# Patient Record
Sex: FEMALE | Race: WHITE | URBAN - NONMETROPOLITAN AREA
[De-identification: names, ages, dates, MRNs, and addresses within clinical notes are randomized per-mention and may not be internally consistent; named-entity substitution may affect disease eponyms.]

---

## 2017-01-01 ENCOUNTER — COMMUNICATION - GICH (OUTPATIENT)
Dept: INTERNAL MEDICINE | Facility: OTHER | Age: 67
End: 2017-01-01

## 2017-01-01 DIAGNOSIS — J44.1 CHRONIC OBSTRUCTIVE PULMONARY DISEASE WITH ACUTE EXACERBATION (H): ICD-10-CM

## 2017-01-01 DIAGNOSIS — J44.9 CHRONIC OBSTRUCTIVE PULMONARY DISEASE (H): ICD-10-CM

## 2017-02-13 ENCOUNTER — COMMUNICATION - GICH (OUTPATIENT)
Dept: INTERNAL MEDICINE | Facility: OTHER | Age: 67
End: 2017-02-13

## 2017-02-13 DIAGNOSIS — F41.1 GENERALIZED ANXIETY DISORDER: ICD-10-CM

## 2017-03-08 ENCOUNTER — COMMUNICATION - GICH (OUTPATIENT)
Dept: INTERNAL MEDICINE | Facility: OTHER | Age: 67
End: 2017-03-08

## 2017-03-08 DIAGNOSIS — J44.9 CHRONIC OBSTRUCTIVE PULMONARY DISEASE (H): ICD-10-CM

## 2017-05-08 ENCOUNTER — COMMUNICATION - GICH (OUTPATIENT)
Dept: INTERNAL MEDICINE | Facility: OTHER | Age: 67
End: 2017-05-08

## 2017-05-08 DIAGNOSIS — F41.1 GENERALIZED ANXIETY DISORDER: ICD-10-CM

## 2017-06-07 ENCOUNTER — COMMUNICATION - GICH (OUTPATIENT)
Dept: INTERNAL MEDICINE | Facility: OTHER | Age: 67
End: 2017-06-07

## 2017-06-07 DIAGNOSIS — F41.1 GENERALIZED ANXIETY DISORDER: ICD-10-CM

## 2017-07-05 ENCOUNTER — COMMUNICATION - GICH (OUTPATIENT)
Dept: INTERNAL MEDICINE | Facility: OTHER | Age: 67
End: 2017-07-05

## 2017-07-05 DIAGNOSIS — F41.1 GENERALIZED ANXIETY DISORDER: ICD-10-CM

## 2017-08-03 ENCOUNTER — COMMUNICATION - GICH (OUTPATIENT)
Dept: INTERNAL MEDICINE | Facility: OTHER | Age: 67
End: 2017-08-03

## 2017-08-03 DIAGNOSIS — F41.1 GENERALIZED ANXIETY DISORDER: ICD-10-CM

## 2017-08-23 ENCOUNTER — COMMUNICATION - GICH (OUTPATIENT)
Dept: INTERNAL MEDICINE | Facility: OTHER | Age: 67
End: 2017-08-23

## 2017-08-23 DIAGNOSIS — J44.9 CHRONIC OBSTRUCTIVE PULMONARY DISEASE (H): ICD-10-CM

## 2017-08-30 ENCOUNTER — HISTORY (OUTPATIENT)
Dept: INTERNAL MEDICINE | Facility: OTHER | Age: 67
End: 2017-08-30

## 2017-08-30 ENCOUNTER — OFFICE VISIT - GICH (OUTPATIENT)
Dept: INTERNAL MEDICINE | Facility: OTHER | Age: 67
End: 2017-08-30

## 2017-08-30 DIAGNOSIS — F41.1 GENERALIZED ANXIETY DISORDER: ICD-10-CM

## 2017-08-30 DIAGNOSIS — I21.4 NON-ST ELEVATION (NSTEMI) MYOCARDIAL INFARCTION (H): ICD-10-CM

## 2017-08-30 DIAGNOSIS — J44.9 CHRONIC OBSTRUCTIVE PULMONARY DISEASE (H): ICD-10-CM

## 2017-08-30 LAB
A/G RATIO - HISTORICAL: 1.4 (ref 1–2)
ABSOLUTE BASOPHILS - HISTORICAL: 0.1 THOU/CU MM
ABSOLUTE EOSINOPHILS - HISTORICAL: 0.3 THOU/CU MM
ABSOLUTE IMMATURE GRANULOCYTES(METAS,MYELOS,PROS) - HISTORICAL: 0 THOU/CU MM
ABSOLUTE LYMPHOCYTES - HISTORICAL: 1.2 THOU/CU MM (ref 0.9–2.9)
ABSOLUTE MONOCYTES - HISTORICAL: 0.7 THOU/CU MM
ABSOLUTE NEUTROPHILS - HISTORICAL: 5.8 THOU/CU MM (ref 1.7–7)
ALBUMIN SERPL-MCNC: 4.1 G/DL (ref 3.5–5.7)
ALP SERPL-CCNC: 117 IU/L (ref 34–104)
ALT (SGPT) - HISTORICAL: 17 IU/L (ref 7–52)
ANION GAP - HISTORICAL: 12 (ref 5–18)
AST SERPL-CCNC: 19 IU/L (ref 13–39)
BASOPHILS # BLD AUTO: 0.8 %
BILIRUB SERPL-MCNC: 0.7 MG/DL (ref 0.3–1)
BUN SERPL-MCNC: 12 MG/DL (ref 7–25)
BUN/CREAT RATIO - HISTORICAL: 18
CALCIUM SERPL-MCNC: 10 MG/DL (ref 8.6–10.3)
CHLORIDE SERPLBLD-SCNC: 104 MMOL/L (ref 98–107)
CO2 SERPL-SCNC: 25 MMOL/L (ref 21–31)
CREAT SERPL-MCNC: 0.67 MG/DL (ref 0.7–1.3)
EOSINOPHIL NFR BLD AUTO: 3.3 %
ERYTHROCYTE [DISTWIDTH] IN BLOOD BY AUTOMATED COUNT: 12.1 % (ref 11.5–15.5)
GFR IF NOT AFRICAN AMERICAN - HISTORICAL: >60 ML/MIN/1.73M2
GLOBULIN - HISTORICAL: 3 G/DL (ref 2–3.7)
GLUCOSE SERPL-MCNC: 101 MG/DL (ref 70–105)
HCT VFR BLD AUTO: 44.3 % (ref 33–51)
HEMOGLOBIN: 15 G/DL (ref 12–16)
IMMATURE GRANULOCYTES(METAS,MYELOS,PROS) - HISTORICAL: 0.3 %
LYMPHOCYTES NFR BLD AUTO: 15.1 % (ref 20–44)
MCH RBC QN AUTO: 33 PG (ref 26–34)
MCHC RBC AUTO-ENTMCNC: 33.9 G/DL (ref 32–36)
MCV RBC AUTO: 97 FL (ref 80–100)
MONOCYTES NFR BLD AUTO: 8.3 %
NEUTROPHILS NFR BLD AUTO: 72.2 % (ref 42–72)
PLATELET # BLD AUTO: 219 THOU/CU MM (ref 140–440)
PMV BLD: 9.5 FL (ref 6.5–11)
POTASSIUM SERPL-SCNC: 4.2 MMOL/L (ref 3.5–5.1)
PROT SERPL-MCNC: 7.1 G/DL (ref 6.4–8.9)
RED BLOOD COUNT - HISTORICAL: 4.55 MIL/CU MM (ref 4–5.2)
SODIUM SERPL-SCNC: 141 MMOL/L (ref 133–143)
WHITE BLOOD COUNT - HISTORICAL: 8 THOU/CU MM (ref 4.5–11)

## 2017-08-30 ASSESSMENT — ANXIETY QUESTIONNAIRES
GAD7 TOTAL SCORE: 6
5. BEING SO RESTLESS THAT IT IS HARD TO SIT STILL: NOT AT ALL
3. WORRYING TOO MUCH ABOUT DIFFERENT THINGS: SEVERAL DAYS
1. FEELING NERVOUS, ANXIOUS, OR ON EDGE: MORE THAN HALF THE DAYS
4. TROUBLE RELAXING: NOT AT ALL
6. BECOMING EASILY ANNOYED OR IRRITABLE: MORE THAN HALF THE DAYS
2. NOT BEING ABLE TO STOP OR CONTROL WORRYING: NOT AT ALL
7. FEELING AFRAID AS IF SOMETHING AWFUL MIGHT HAPPEN: SEVERAL DAYS

## 2017-08-30 ASSESSMENT — PATIENT HEALTH QUESTIONNAIRE - PHQ9: SUM OF ALL RESPONSES TO PHQ QUESTIONS 1-9: 0

## 2017-09-23 ENCOUNTER — AMBULATORY - GICH (OUTPATIENT)
Dept: SCHEDULING | Facility: OTHER | Age: 67
End: 2017-09-23

## 2017-12-28 NOTE — TELEPHONE ENCOUNTER
Patient Information     Patient Name MRN Aggie Peacock [id number] Female 1950      Telephone Encounter by Letty rBock RN at 2017 10:01 AM     Author:  Letty Brock RN Service:  (none) Author Type:  NURS- Registered Nurse     Filed:  2017 10:03 AM Encounter Date:  11/3/2017 Status:  Signed     :  Letty Brock RN (NURS- Registered Nurse)            SYMBICORT 160-4.5 mcg/actuation (160-4.5 mcg each actuation) inhaler  INHALE TWO PUFFS BY MOUTH TWICE DAILY  Disp: Not specified (Pharmacy requested 33 NO_MATCH)     Refills: 0     Class: eRx Start: 11/3/2017    For: COPD (chronic obstructive pulmonary disease) (HC)  Originally ordered: 2 years ago by Anthony Mckinney MD  Last refill:2017  To be filled at: [facility] [address] [place], [place] [address]Phone: [phone]  Last visit with ANTHONY MCKINNEY was on: 2017 in [facility]  PCP:  Anthony Mckinney MD     Unable to complete prescription refill per RN Medication Refill Policy.................... LETTY BROCK, ERIC ....................  2017   10:01 AM

## 2017-12-28 NOTE — TELEPHONE ENCOUNTER
Patient Information     Patient Name MRN Sex Aggie Sabillon [id number] Female 1950      Telephone Encounter by Adalgisa Bentley LPN at 2017  1:43 PM     Author:  Adalgisa Bentley LPN Service:  (none) Author Type:  NURS- Licensed Practical Nurse     Filed:  2017  1:44 PM Encounter Date:  8/3/2017 Status:  Signed     :  Adalgisa Bentley LPN (NURS- Licensed Practical Nurse)            Contacted the patient and let her know Cayden Hartman MD would like her to have a follow up visit. The patient was transferred to scheduling to make a appointment.  Adalgisa Bentley LPN......2017  1:43 PM

## 2017-12-28 NOTE — TELEPHONE ENCOUNTER
Patient Information     Patient Name MRN Sex Aggie Sabillon [id number] Female 1950      Telephone Encounter by Mary Martinez RN at 2017 10:58 AM     Author:  Mary Martinez RN Service:  (none) Author Type:  NURS- Registered Nurse     Filed:  2017 11:01 AM Encounter Date:  2017 Status:  Signed     :  Mary Martinez RN (NURS- Registered Nurse)            Medication is not on refill protocol. Unable to complete prescription refill per RN Medication Refill Policy.................... MARY MARTINEZ RN ....................  2017   10:59 AM      Will route to [facility] for consideration  This is a Refill request from: [facility]  Name of Medication: Xanax 0.5mg  Quantity requested: 60  Last fill date: 5/10/17  Last visit with ANTHONY MCKINNEY was on: 2016 in [facility]  PCP:  Anthony Mckinney MD  Controlled Substance Agreement:  none   Diagnosis r/t this medication request: IRVING

## 2017-12-28 NOTE — TELEPHONE ENCOUNTER
Patient Information     Patient Name MRN Sex Aggie Sabillon [id number] Female 1950      Telephone Encounter by Shirin Carvajal RN at 2017 11:24 AM     Author:  Shirin Carvajal RN Service:  (none) Author Type:  NURS- Registered Nurse     Filed:  2017 11:27 AM Encounter Date:  8/3/2017 Status:  Signed     :  Shirin Carvajal RN (NURS- Registered Nurse)            This is a Refill request from: [facility]  Name of Medication: alprazolam  Quantity requested: 60  Last fill date: 17  Due for refill: yes  Last visit with CAYDEN MCKINNEY was on: 2016 in [facility]  PCP:  Cayden Mckinney MD  Controlled Substance Agreement:  None found   Diagnosis r/t this medication request: Generalized anxiety disorder     Unable to complete prescription refill per RN Medication Refill Policy.................... Shirin Carvajal RN ....................  2017   11:24 AM

## 2017-12-28 NOTE — TELEPHONE ENCOUNTER
Patient Information     Patient Name MRN Sex Aggie Sabillon [id number] Female 1950      Telephone Encounter by Janice Wagner RN at 2017  3:38 PM     Author:  Janice Wagner RN Service:  (none) Author Type:  NURS- Registered Nurse     Filed:  2017  3:44 PM Encounter Date:  2017 Status:  Signed     :  Janice Wagner RN (NURS- Registered Nurse)            Patient lost inhaler. Requesting order for replacement.    Oral Steroid Inhalers    Office visit in the past 12 months.    Last visit with ANTHONY MCKINNEY was on: 2016 in [facility]  Next visit with ANTHONY MCKINNEY is on: No future appointment listed with this provider  Next visit with Internal Medicine is on: No future appointment listed in this department    Max refills 12 months from last office visit.    Prescription refilled per RN Medication Refill Policy.................... Janice Wagner RN ....................  2017   3:41 PM

## 2017-12-28 NOTE — TELEPHONE ENCOUNTER
Patient Information     Patient Name MRN Aggie Peacock [id number] Female 1950      Telephone Encounter by Cayden Hartman MD at 2017 11:41 AM     Author:  Cayden Hartman MD Service:  (none) Author Type:  Physician     Filed:  2017 11:41 AM Encounter Date:  8/3/2017 Status:  Signed     :  Cayden Hartman MD (Physician)            Notify the patient that she is due for a follow-up visit.

## 2017-12-28 NOTE — TELEPHONE ENCOUNTER
Patient Information     Patient Name MRN Aggie Peacock [id number] Female 1950      Telephone Encounter by Padma Lau DO at 2017  3:39 PM     Author:  Padma Lau DO Service:  (none) Author Type:  PHYS- Osteopathic     Filed:  2017  3:39 PM Encounter Date:  2017 Status:  Signed     :  Padma Lau DO (PHYS- Osteopathic)            Prescription printed and ready to fax

## 2017-12-28 NOTE — TELEPHONE ENCOUNTER
"Patient Information     Patient Name MRN Aggie Peacock [id number] Female 1950      Telephone Encounter by Letty Duran RN at 11/3/2017  9:02 AM     Author:  Letty Duran RN Service:  (none) Author Type:  NURS- Registered Nurse     Filed:  11/3/2017  9:15 AM Encounter Date:  10/31/2017 Status:  Signed     :  Letty Duran RN (NURS- Registered Nurse)            Nebulizers    Office visit in the past 12 months.    Last visit with ANTHONY MCKINNEY was on: 2017 in [facility]  Next visit with ANTHONY MCKINNEY is on: No future appointment listed with this provider  Next visit with Internal Medicine is on: No future appointment listed in this department    Max refills 12 months from last office visit.  Note from pharmacy: \"Please consider 90 day supplies to promote better adherence\"  Called [facility] and for a 90 day supply with patient instructions patient would need 43 boxes. They will adjust down as patient wants or uses.    Per LOV note patient to follow up in 6 months.    Prescription refilled per RN Medication Refill Policy.................... LETTY DURAN RN ....................  11/3/2017   9:03 AM              "

## 2017-12-28 NOTE — TELEPHONE ENCOUNTER
Patient Information     Patient Name MRN Sex Aggie Sabillon [id number] Female 1950      Telephone Encounter by Mary Martinez RN at 2017  9:31 AM     Author:  Mary Martinez RN Service:  (none) Author Type:  NURS- Registered Nurse     Filed:  2017  9:33 AM Encounter Date:  2017 Status:  Signed     :  Mary Martinez RN (NURS- Registered Nurse)            Medication is not on refill protocol. Unable to complete prescription refill per RN Medication Refill Policy.................... MARY MARTINEZ RN ....................  2017   9:31 AM      This is a Refill request from: [facility]  Name of Medication: Xanax 0.5mg  Quantity requested: 60  Last fill date: 17  Last visit with MARTIN BALLARD was on: No past appointments listed with this provider  PCP:  Cayden Hartman MD  Controlled Substance Agreement:  none   Diagnosis r/t this medication request: IRVING
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
## 2017-12-30 NOTE — NURSING NOTE
Patient Information     Patient Name MRN Aggie Peacock [id number] Female 1950      Nursing Note by Adalgisa Bentley LPN at 2017 11:20 AM     Author:  Adalgisa Bentley LPN Service:  (none) Author Type:  NURS- Licensed Practical Nurse     Filed:  2017 11:30 AM Encounter Date:  2017 Status:  Signed     :  Adalgisa Bentley LPN (NURS- Licensed Practical Nurse)            The patient is here today to be seen for a visit about her xanax.  Adalgisa Bentley LPN......2017  11:22 AM

## 2018-01-01 ENCOUNTER — OFFICE VISIT (OUTPATIENT)
Dept: INTERNAL MEDICINE | Facility: OTHER | Age: 68
End: 2018-01-01
Attending: INTERNAL MEDICINE
Payer: MEDICARE

## 2018-01-01 ENCOUNTER — HOSPITAL ENCOUNTER (EMERGENCY)
Facility: OTHER | Age: 68
Discharge: ANOTHER HOSPITAL | End: 2018-09-17
Attending: EMERGENCY MEDICINE | Admitting: EMERGENCY MEDICINE
Payer: MEDICARE

## 2018-01-01 ENCOUNTER — TELEPHONE (OUTPATIENT)
Dept: INTERNAL MEDICINE | Facility: OTHER | Age: 68
End: 2018-01-01

## 2018-01-01 ENCOUNTER — APPOINTMENT (OUTPATIENT)
Dept: GENERAL RADIOLOGY | Facility: OTHER | Age: 68
End: 2018-01-01
Attending: EMERGENCY MEDICINE
Payer: MEDICARE

## 2018-01-01 ENCOUNTER — TRANSFERRED RECORDS (OUTPATIENT)
Dept: HEALTH INFORMATION MANAGEMENT | Facility: OTHER | Age: 68
End: 2018-01-01

## 2018-01-01 ENCOUNTER — HOSPITAL ENCOUNTER (OUTPATIENT)
Dept: RADIOLOGY | Facility: OTHER | Age: 68
End: 2018-02-09
Attending: INTERNAL MEDICINE

## 2018-01-01 ENCOUNTER — HISTORY (OUTPATIENT)
Dept: INTERNAL MEDICINE | Facility: OTHER | Age: 68
End: 2018-01-01

## 2018-01-01 ENCOUNTER — COMMUNICATION - GICH (OUTPATIENT)
Dept: INTERNAL MEDICINE | Facility: OTHER | Age: 68
End: 2018-01-01

## 2018-01-01 ENCOUNTER — OFFICE VISIT - GICH (OUTPATIENT)
Dept: INTERNAL MEDICINE | Facility: OTHER | Age: 68
End: 2018-01-01

## 2018-01-01 ENCOUNTER — DOCUMENTATION ONLY (OUTPATIENT)
Dept: FAMILY MEDICINE | Facility: OTHER | Age: 68
End: 2018-01-01

## 2018-01-01 ENCOUNTER — APPOINTMENT (OUTPATIENT)
Dept: CT IMAGING | Facility: OTHER | Age: 68
End: 2018-01-01
Attending: EMERGENCY MEDICINE
Payer: MEDICARE

## 2018-01-01 VITALS
TEMPERATURE: 99.1 F | HEIGHT: 64 IN | DIASTOLIC BLOOD PRESSURE: 82 MMHG | HEART RATE: 141 BPM | RESPIRATION RATE: 31 BRPM | WEIGHT: 120 LBS | OXYGEN SATURATION: 98 % | BODY MASS INDEX: 20.49 KG/M2 | SYSTOLIC BLOOD PRESSURE: 127 MMHG

## 2018-01-01 VITALS
SYSTOLIC BLOOD PRESSURE: 130 MMHG | BODY MASS INDEX: 22.36 KG/M2 | HEIGHT: 64 IN | OXYGEN SATURATION: 95 % | HEART RATE: 99 BPM | WEIGHT: 131 LBS | DIASTOLIC BLOOD PRESSURE: 70 MMHG

## 2018-01-01 VITALS — WEIGHT: 130 LBS | SYSTOLIC BLOOD PRESSURE: 114 MMHG | BODY MASS INDEX: 22.19 KG/M2 | DIASTOLIC BLOOD PRESSURE: 72 MMHG

## 2018-01-01 VITALS
SYSTOLIC BLOOD PRESSURE: 112 MMHG | WEIGHT: 124 LBS | DIASTOLIC BLOOD PRESSURE: 84 MMHG | BODY MASS INDEX: 21.97 KG/M2 | HEART RATE: 84 BPM | OXYGEN SATURATION: 90 %

## 2018-01-01 VITALS — RESPIRATION RATE: 18 BRPM | DIASTOLIC BLOOD PRESSURE: 72 MMHG | SYSTOLIC BLOOD PRESSURE: 124 MMHG | HEART RATE: 80 BPM

## 2018-01-01 DIAGNOSIS — C18.4 MALIGNANT NEOPLASM OF TRANSVERSE COLON (H): ICD-10-CM

## 2018-01-01 DIAGNOSIS — F41.1 GENERALIZED ANXIETY DISORDER: ICD-10-CM

## 2018-01-01 DIAGNOSIS — J43.2 CENTRILOBULAR EMPHYSEMA (H): ICD-10-CM

## 2018-01-01 DIAGNOSIS — K63.1 PERFORATED BOWEL (H): Primary | ICD-10-CM

## 2018-01-01 DIAGNOSIS — R10.30 LOWER ABDOMINAL PAIN: ICD-10-CM

## 2018-01-01 DIAGNOSIS — K63.89 MASS OF COLON: Primary | ICD-10-CM

## 2018-01-01 DIAGNOSIS — I21.4 NON-ST ELEVATION (NSTEMI) MYOCARDIAL INFARCTION (H): ICD-10-CM

## 2018-01-01 DIAGNOSIS — K63.89 MASS OF COLON: ICD-10-CM

## 2018-01-01 DIAGNOSIS — J44.9 CHRONIC OBSTRUCTIVE PULMONARY DISEASE (H): ICD-10-CM

## 2018-01-01 DIAGNOSIS — I21.4 NSTEMI (NON-ST ELEVATED MYOCARDIAL INFARCTION) (H): ICD-10-CM

## 2018-01-01 DIAGNOSIS — J44.9 CHRONIC OBSTRUCTIVE PULMONARY DISEASE, UNSPECIFIED COPD TYPE (H): Primary | ICD-10-CM

## 2018-01-01 DIAGNOSIS — J44.9 CHRONIC OBSTRUCTIVE PULMONARY DISEASE, UNSPECIFIED COPD TYPE (H): ICD-10-CM

## 2018-01-01 LAB
A/G RATIO - HISTORICAL: 1.8 (ref 1–2)
ABSOLUTE BASOPHILS - HISTORICAL: 0 THOU/CU MM
ABSOLUTE EOSINOPHILS - HISTORICAL: 0.3 THOU/CU MM
ABSOLUTE IMMATURE GRANULOCYTES(METAS,MYELOS,PROS) - HISTORICAL: 0 THOU/CU MM
ABSOLUTE LYMPHOCYTES - HISTORICAL: 1.3 THOU/CU MM (ref 0.9–2.9)
ABSOLUTE MONOCYTES - HISTORICAL: 0.6 THOU/CU MM
ABSOLUTE NEUTROPHILS - HISTORICAL: 5.4 THOU/CU MM (ref 1.7–7)
ALBUMIN SERPL-MCNC: 3.4 G/DL (ref 3.5–5.7)
ALBUMIN SERPL-MCNC: 4.2 G/DL (ref 3.5–5.7)
ALBUMIN UR-MCNC: 100 MG/DL
ALP SERPL-CCNC: 104 IU/L (ref 34–104)
ALP SERPL-CCNC: 129 U/L (ref 34–104)
ALT (SGPT) - HISTORICAL: 11 IU/L (ref 7–52)
ALT SERPL W P-5'-P-CCNC: 8 U/L (ref 7–52)
ANION GAP - HISTORICAL: 13 (ref 5–18)
ANION GAP SERPL CALCULATED.3IONS-SCNC: 12 MMOL/L (ref 3–14)
APPEARANCE UR: CLEAR
AST SERPL W P-5'-P-CCNC: 17 U/L (ref 13–39)
AST SERPL-CCNC: 15 IU/L (ref 13–39)
BACTERIA #/AREA URNS HPF: ABNORMAL /HPF
BASOPHILS # BLD AUTO: 0 10E9/L (ref 0–0.2)
BASOPHILS # BLD AUTO: 0.5 %
BASOPHILS NFR BLD AUTO: 0.2 %
BILIRUB SERPL-MCNC: 0.3 MG/DL (ref 0.3–1)
BILIRUB SERPL-MCNC: 0.5 MG/DL (ref 0.3–1)
BILIRUB UR QL STRIP: ABNORMAL
BUN SERPL-MCNC: 13 MG/DL (ref 7–25)
BUN SERPL-MCNC: 16 MG/DL (ref 7–25)
BUN/CREAT RATIO - HISTORICAL: 27
CALCIUM SERPL-MCNC: 10.1 MG/DL (ref 8.6–10.3)
CALCIUM SERPL-MCNC: 9.4 MG/DL (ref 8.6–10.3)
CHLORIDE SERPL-SCNC: 95 MMOL/L (ref 98–107)
CHLORIDE SERPLBLD-SCNC: 103 MMOL/L (ref 98–107)
CO2 SERPL-SCNC: 25 MMOL/L (ref 21–31)
CO2 SERPL-SCNC: 30 MMOL/L (ref 21–31)
COLOR UR AUTO: YELLOW
CREAT SERPL-MCNC: 0.51 MG/DL (ref 0.6–1.2)
CREAT SERPL-MCNC: 0.59 MG/DL (ref 0.7–1.3)
DIFFERENTIAL METHOD BLD: ABNORMAL
EOSINOPHIL # BLD AUTO: 0.1 10E9/L (ref 0–0.7)
EOSINOPHIL NFR BLD AUTO: 0.4 %
EOSINOPHIL NFR BLD AUTO: 3.7 %
ERYTHROCYTE [DISTWIDTH] IN BLOOD BY AUTOMATED COUNT: 11.7 % (ref 10–15)
ERYTHROCYTE [DISTWIDTH] IN BLOOD BY AUTOMATED COUNT: 11.9 % (ref 11.5–15.5)
GFR IF NOT AFRICAN AMERICAN - HISTORICAL: >60 ML/MIN/1.73M2
GFR SERPL CREATININE-BSD FRML MDRD: >90 ML/MIN/1.7M2
GLOBULIN - HISTORICAL: 2.4 G/DL (ref 2–3.7)
GLUCOSE SERPL-MCNC: 109 MG/DL (ref 70–105)
GLUCOSE SERPL-MCNC: 137 MG/DL (ref 70–105)
GLUCOSE UR STRIP-MCNC: 100 MG/DL
HCT VFR BLD AUTO: 42 % (ref 33–51)
HCT VFR BLD AUTO: 42.2 % (ref 35–47)
HEMOGLOBIN: 13.9 G/DL (ref 12–16)
HGB BLD-MCNC: 13.6 G/DL (ref 11.7–15.7)
HGB UR QL STRIP: NEGATIVE
HYALINE CASTS #/AREA URNS LPF: ABNORMAL /LPF
IMM GRANULOCYTES # BLD: 0.1 10E9/L (ref 0–0.4)
IMM GRANULOCYTES NFR BLD: 0.8 %
IMMATURE GRANULOCYTES(METAS,MYELOS,PROS) - HISTORICAL: 0.3 %
KETONES UR STRIP-MCNC: 40 MG/DL
LACTATE SERPL-SCNC: 0.8 MMOL/L (ref 0.5–2.2)
LEUKOCYTE ESTERASE UR QL STRIP: NEGATIVE
LIPASE SERPL-CCNC: 12 U/L (ref 11–82)
LYMPHOCYTES # BLD AUTO: 0.8 10E9/L (ref 0.8–5.3)
LYMPHOCYTES NFR BLD AUTO: 17.3 % (ref 20–44)
LYMPHOCYTES NFR BLD AUTO: 5 %
MCH RBC QN AUTO: 30.5 PG (ref 26.5–33)
MCH RBC QN AUTO: 32.3 PG (ref 26–34)
MCHC RBC AUTO-ENTMCNC: 32.2 G/DL (ref 31.5–36.5)
MCHC RBC AUTO-ENTMCNC: 33.1 G/DL (ref 32–36)
MCV RBC AUTO: 95 FL (ref 78–100)
MCV RBC AUTO: 97 FL (ref 80–100)
MONOCYTES # BLD AUTO: 1.4 10E9/L (ref 0–1.3)
MONOCYTES NFR BLD AUTO: 7.2 %
MONOCYTES NFR BLD AUTO: 8.2 %
MUCOUS THREADS #/AREA URNS LPF: PRESENT /LPF
NEUTROPHILS # BLD AUTO: 14.3 10E9/L (ref 1.6–8.3)
NEUTROPHILS NFR BLD AUTO: 71 % (ref 42–72)
NEUTROPHILS NFR BLD AUTO: 85.4 %
NITRATE UR QL: NEGATIVE
NON-SQ EPI CELLS #/AREA URNS LPF: ABNORMAL /LPF
PH UR STRIP: 5.5 PH (ref 5–9)
PLATELET # BLD AUTO: 232 THOU/CU MM (ref 140–440)
PLATELET # BLD AUTO: 464 10E9/L (ref 150–450)
PMV BLD: 9.4 FL (ref 6.5–11)
POTASSIUM SERPL-SCNC: 3.8 MMOL/L (ref 3.5–5.1)
POTASSIUM SERPL-SCNC: 4.3 MMOL/L (ref 3.5–5.1)
PROT SERPL-MCNC: 6.2 G/DL (ref 6.4–8.9)
PROT SERPL-MCNC: 6.6 G/DL (ref 6.4–8.9)
RBC # BLD AUTO: 4.46 10E12/L (ref 3.8–5.2)
RBC #/AREA URNS AUTO: ABNORMAL /HPF
RED BLOOD COUNT - HISTORICAL: 4.31 MIL/CU MM (ref 4–5.2)
SODIUM SERPL-SCNC: 137 MMOL/L (ref 134–144)
SODIUM SERPL-SCNC: 141 MMOL/L (ref 133–143)
SOURCE: ABNORMAL
SP GR UR STRIP: 1.02 (ref 1–1.03)
UROBILINOGEN UR STRIP-ACNC: 1 EU/DL (ref 0.2–1)
WBC # BLD AUTO: 16.7 10E9/L (ref 4–11)
WBC #/AREA URNS AUTO: ABNORMAL /HPF
WHITE BLOOD COUNT - HISTORICAL: 7.7 THOU/CU MM (ref 4.5–11)

## 2018-01-01 PROCEDURE — 96375 TX/PRO/DX INJ NEW DRUG ADDON: CPT | Performed by: EMERGENCY MEDICINE

## 2018-01-01 PROCEDURE — 96376 TX/PRO/DX INJ SAME DRUG ADON: CPT | Performed by: EMERGENCY MEDICINE

## 2018-01-01 PROCEDURE — 83605 ASSAY OF LACTIC ACID: CPT | Performed by: EMERGENCY MEDICINE

## 2018-01-01 PROCEDURE — 96365 THER/PROPH/DIAG IV INF INIT: CPT | Mod: XU | Performed by: EMERGENCY MEDICINE

## 2018-01-01 PROCEDURE — 99285 EMERGENCY DEPT VISIT HI MDM: CPT | Mod: Z6 | Performed by: EMERGENCY MEDICINE

## 2018-01-01 PROCEDURE — 71045 X-RAY EXAM CHEST 1 VIEW: CPT

## 2018-01-01 PROCEDURE — 99214 OFFICE O/P EST MOD 30 MIN: CPT | Performed by: INTERNAL MEDICINE

## 2018-01-01 PROCEDURE — 74177 CT ABD & PELVIS W/CONTRAST: CPT

## 2018-01-01 PROCEDURE — 99204 OFFICE O/P NEW MOD 45 MIN: CPT | Performed by: SURGERY

## 2018-01-01 PROCEDURE — 74019 RADEX ABDOMEN 2 VIEWS: CPT

## 2018-01-01 PROCEDURE — 25500064 ZZH RX 255 OP 636: Performed by: EMERGENCY MEDICINE

## 2018-01-01 PROCEDURE — 81001 URINALYSIS AUTO W/SCOPE: CPT | Performed by: EMERGENCY MEDICINE

## 2018-01-01 PROCEDURE — 25000125 ZZHC RX 250: Performed by: EMERGENCY MEDICINE

## 2018-01-01 PROCEDURE — G0463 HOSPITAL OUTPT CLINIC VISIT: HCPCS

## 2018-01-01 PROCEDURE — 80053 COMPREHEN METABOLIC PANEL: CPT | Performed by: EMERGENCY MEDICINE

## 2018-01-01 PROCEDURE — 85025 COMPLETE CBC W/AUTO DIFF WBC: CPT | Performed by: EMERGENCY MEDICINE

## 2018-01-01 PROCEDURE — 83690 ASSAY OF LIPASE: CPT | Performed by: EMERGENCY MEDICINE

## 2018-01-01 PROCEDURE — 25000128 H RX IP 250 OP 636: Performed by: EMERGENCY MEDICINE

## 2018-01-01 PROCEDURE — 99285 EMERGENCY DEPT VISIT HI MDM: CPT | Mod: 25 | Performed by: EMERGENCY MEDICINE

## 2018-01-01 RX ORDER — HYDROMORPHONE HYDROCHLORIDE 1 MG/ML
0.5 INJECTION, SOLUTION INTRAMUSCULAR; INTRAVENOUS; SUBCUTANEOUS
Status: DISCONTINUED | OUTPATIENT
Start: 2018-01-01 | End: 2018-01-01 | Stop reason: HOSPADM

## 2018-01-01 RX ORDER — ALPRAZOLAM 0.5 MG
TABLET ORAL
Qty: 45 TABLET | Refills: 1 | Status: SHIPPED | OUTPATIENT
Start: 2018-01-01 | End: 2018-01-01

## 2018-01-01 RX ORDER — SODIUM CHLORIDE 9 MG/ML
1000 INJECTION, SOLUTION INTRAVENOUS CONTINUOUS
Status: DISCONTINUED | OUTPATIENT
Start: 2018-01-01 | End: 2018-01-01 | Stop reason: HOSPADM

## 2018-01-01 RX ORDER — BUDESONIDE AND FORMOTEROL FUMARATE DIHYDRATE 160; 4.5 UG/1; UG/1
2 AEROSOL RESPIRATORY (INHALATION) 2 TIMES DAILY
COMMUNITY
Start: 2017-01-01

## 2018-01-01 RX ORDER — IPRATROPIUM BROMIDE AND ALBUTEROL SULFATE 2.5; .5 MG/3ML; MG/3ML
3 SOLUTION RESPIRATORY (INHALATION) ONCE
Status: COMPLETED | OUTPATIENT
Start: 2018-01-01 | End: 2018-01-01

## 2018-01-01 RX ORDER — NITROGLYCERIN 0.4 MG/1
1 TABLET SUBLINGUAL EVERY 5 MIN PRN
COMMUNITY
Start: 2016-02-05 | End: 2018-01-01

## 2018-01-01 RX ORDER — ALPRAZOLAM 0.5 MG
1 TABLET ORAL 2 TIMES DAILY PRN
COMMUNITY
Start: 2017-08-30 | End: 2018-01-01

## 2018-01-01 RX ORDER — ISOSORBIDE MONONITRATE 30 MG/1
TABLET, EXTENDED RELEASE ORAL
COMMUNITY
Start: 2018-01-01 | End: 2018-01-01

## 2018-01-01 RX ORDER — ALPRAZOLAM 0.5 MG
0.5 TABLET ORAL 3 TIMES DAILY PRN
Qty: 75 TABLET | Refills: 3 | Status: SHIPPED | OUTPATIENT
Start: 2018-01-01

## 2018-01-01 RX ORDER — NITROGLYCERIN 0.4 MG/1
0.4 TABLET SUBLINGUAL EVERY 5 MIN PRN
Qty: 25 TABLET | Refills: 3 | Status: SHIPPED | OUTPATIENT
Start: 2018-01-01

## 2018-01-01 RX ORDER — ACETAMINOPHEN 500 MG
500-1000 TABLET ORAL EVERY 6 HOURS PRN
COMMUNITY

## 2018-01-01 RX ORDER — ALPRAZOLAM 0.5 MG
TABLET ORAL
Qty: 45 TABLET | Refills: 0 | Status: SHIPPED | OUTPATIENT
Start: 2018-01-01 | End: 2018-01-01

## 2018-01-01 RX ORDER — ALBUTEROL SULFATE 0.83 MG/ML
1 SOLUTION RESPIRATORY (INHALATION)
COMMUNITY
Start: 2017-01-01

## 2018-01-01 RX ORDER — ONDANSETRON 2 MG/ML
4 INJECTION INTRAMUSCULAR; INTRAVENOUS EVERY 30 MIN PRN
Status: DISCONTINUED | OUTPATIENT
Start: 2018-01-01 | End: 2018-01-01 | Stop reason: HOSPADM

## 2018-01-01 RX ORDER — ISOSORBIDE MONONITRATE 30 MG/1
15 TABLET, EXTENDED RELEASE ORAL DAILY
Qty: 90 TABLET | Refills: 3 | COMMUNITY
Start: 2018-01-01

## 2018-01-01 RX ORDER — ALPRAZOLAM 0.5 MG
0.5 TABLET ORAL 2 TIMES DAILY PRN
Qty: 45 TABLET | Refills: 3 | Status: SHIPPED | OUTPATIENT
Start: 2018-01-01 | End: 2018-01-01

## 2018-01-01 RX ORDER — ALPRAZOLAM 0.5 MG
0.5 TABLET ORAL 2 TIMES DAILY PRN
Qty: 45 TABLET | Refills: 3 | OUTPATIENT
Start: 2018-01-01

## 2018-01-01 RX ADMIN — TAZOBACTAM SODIUM AND PIPERACILLIN SODIUM 3.38 G: 375; 3 INJECTION, SOLUTION INTRAVENOUS at 20:14

## 2018-01-01 RX ADMIN — IOHEXOL 100 ML: 350 INJECTION, SOLUTION INTRAVENOUS at 16:56

## 2018-01-01 RX ADMIN — IPRATROPIUM BROMIDE AND ALBUTEROL SULFATE 3 ML: .5; 3 SOLUTION RESPIRATORY (INHALATION) at 21:26

## 2018-01-01 RX ADMIN — ONDANSETRON 4 MG: 2 INJECTION INTRAMUSCULAR; INTRAVENOUS at 21:48

## 2018-01-01 RX ADMIN — HYDROMORPHONE HYDROCHLORIDE 0.5 MG: 1 INJECTION, SOLUTION INTRAMUSCULAR; INTRAVENOUS; SUBCUTANEOUS at 15:25

## 2018-01-01 RX ADMIN — SODIUM CHLORIDE 1000 ML: 900 INJECTION, SOLUTION INTRAVENOUS at 16:04

## 2018-01-01 RX ADMIN — SODIUM CHLORIDE 1000 ML: 900 INJECTION, SOLUTION INTRAVENOUS at 14:31

## 2018-01-01 RX ADMIN — ONDANSETRON 4 MG: 2 INJECTION INTRAMUSCULAR; INTRAVENOUS at 14:30

## 2018-01-01 RX ADMIN — HYDROMORPHONE HYDROCHLORIDE 0.5 MG: 1 INJECTION, SOLUTION INTRAMUSCULAR; INTRAVENOUS; SUBCUTANEOUS at 21:49

## 2018-01-01 ASSESSMENT — ENCOUNTER SYMPTOMS
CONSTITUTIONAL NEGATIVE: 1
CONSTITUTIONAL NEGATIVE: 1
ENDOCRINE NEGATIVE: 1
ALLERGIC/IMMUNOLOGIC NEGATIVE: 1
HEMATOLOGIC/LYMPHATIC NEGATIVE: 1
ALLERGIC/IMMUNOLOGIC NEGATIVE: 1
HEMATOLOGIC/LYMPHATIC NEGATIVE: 1
ENDOCRINE NEGATIVE: 1

## 2018-01-01 ASSESSMENT — PATIENT HEALTH QUESTIONNAIRE - PHQ9
SUM OF ALL RESPONSES TO PHQ QUESTIONS 1-9: 0

## 2018-01-01 ASSESSMENT — ANXIETY QUESTIONNAIRES: GAD7 TOTAL SCORE: 6

## 2018-01-03 NOTE — TELEPHONE ENCOUNTER
Patient Information     Patient Name MRN Aggie Peacock [id number] Female 1950      Telephone Encounter by Itzel Whitley RN at 3/8/2017 12:00 PM     Author:  Itzle Whitley RN Service:  (none) Author Type:  NURS- Registered Nurse     Filed:  3/8/2017 12:01 PM Encounter Date:  3/8/2017 Status:  Signed     :  Itzel Whitley RN (NURS- Registered Nurse)            This is a Refill request from: [facility]  Name of Medication: INCRUSE ELLIPTA 62.5 mcg/actuation powder for inhalation  INHALE ONE PUFF INTO LUNGS ONCE DAILY  Quantity requested: 90  Last fill date: 16  Due for refill: yes  Last visit with ANTHONY MCKINNEY was on: 2016 in [facility]  PCP:  Anthony Mckinney MD  Controlled Substance Agreement:  na   Diagnosis r/t this medication request: COPD    Not on RN protocol     Unable to complete prescription refill per RN Medication Refill Policy.................... ITZEL WHITLEY RN ....................  3/8/2017   12:00 PM

## 2018-01-03 NOTE — TELEPHONE ENCOUNTER
Patient Information     Patient Name MRN Aggie Peacock [id number] Female 1950      Telephone Encounter by Letty Brock RN at 2017  3:01 PM     Author:  Letty Brock RN Service:  (none) Author Type:  NURS- Registered Nurse     Filed:  2017  3:02 PM Encounter Date:  2017 Status:  Signed     :  Letty Brock RN (NURS- Registered Nurse)            ALPRAZolam (XANAX) 0.5 mg tablet  TAKE ONE TABLET BY MOUTH THREE TIMES DAILY AS NEEDED FOR ANXIETY  Disp: 60 tablet Refills: 0    Class: eRx Start: 2017    For: Generalized anxiety disorder  Documented:11 months ago  Last refill: 2017  To be filled at: [facility] [address] [place], [place] [address]Phone: [phone]    Last visit with ANTHONY MCKINNEY was on: 2016 in [facility]  PCP:  Anthony Mckinney MD  Controlled Substance Agreement:  None noted      Unable to complete prescription refill per RN Medication Refill Policy.................... LETTY BROCK RN ....................  2017   3:01 PM

## 2018-01-04 NOTE — TELEPHONE ENCOUNTER
Patient Information     Patient Name MRN Aggie Peacock [id number] Female 1950      Telephone Encounter by Letty Brock RN at 2017  3:22 PM     Author:  Letty Brock RN Service:  (none) Author Type:  NURS- Registered Nurse     Filed:  2017  3:24 PM Encounter Date:  2017 Status:  Signed     :  Letty Brock RN (NURS- Registered Nurse)            ALPRAZolam (XANAX) 0.5 mg tablet  TAKE ONE TABLET BY MOUTH THREE TIMES DAILY AS NEEDED FOR ANXIETY  Disp: 60 tablet Refills: 0    Class: eRx Start: 2017    For: Generalized anxiety disorder  Documented:1 year ago  Last refill:2017  To be filled at: [facility] [address] [place], [place] [address]Phone: [phone]  Last visit with ANTHONY MCKINNEY was on: 2016 in [facility]  PCP:  Anthony Mckinney MD  Controlled Substance Agreement:  None noted      Unable to complete prescription refill per RN Medication Refill Policy.................... LETTY BROCK RN ....................  2017   3:22 PM

## 2018-02-01 PROBLEM — E78.5 HYPERLIPIDEMIA: Status: ACTIVE | Noted: 2018-01-01

## 2018-02-01 PROBLEM — G44.209 TENSION HEADACHE: Status: ACTIVE | Noted: 2018-01-01

## 2018-02-01 PROBLEM — Z72.0 TOBACCO ABUSE: Status: ACTIVE | Noted: 2018-01-01

## 2018-02-13 NOTE — PROGRESS NOTES
Patient Information     Patient Name MRN Sex Aggie Sabillon [id number] Female 1950      Progress Notes by Stacie Vaaldez at 2018  8:46 AM     Author:  Stacie Valadez Service:  (none) Author Type:  Other Clinical Staff     Filed:  2018  8:46 AM Date of Service:  2018  8:46 AM Status:  Signed     :  Stacie Valadez (Other Clinical Staff)            1.  Has the patient had a previous reaction to IV contrast? No    2.  Does the patient have kidney disease? No    3.  Is the patient on dialysis? No    If YES to any of these questions, exam will be reviewed with a Radiologist before administering contrast.
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
## 2018-02-13 NOTE — TELEPHONE ENCOUNTER
Patient Information     Patient Name MRN Sex Aggie Sabillon [id number] Female 1950      Telephone Encounter by Shirin Murphy RN at 2018  3:30 PM     Author:  Shirin Murphy RN Service:  (none) Author Type:  NURS- Registered Nurse     Filed:  2018  3:59 PM Encounter Date:  2018 Status:  Signed     :  Shirin Murphy RN (NURS- Registered Nurse)            Pharmacy is requesting refill of Imdur which was d/c'd at last office visit due to possibility of it causing heart palpitations. Spoke with Candelaria, pharmacy tech, at [facility], and informed that Imdur is refused due to being discontinued on 2017.  Pharmacy is also requesting a fill of Incruse Ellipta, not on RN protocol. Routed to Anthony Mckinney MD.   Unable to complete prescription refill per RN Medication Refill Policy.................... Shirin Murphy RN ....................  2018   3:53 PM    This is a Refill request from: [facility]  Name of Medication: INCRUSE ELLIPTA 62.5 mcg/actuation powder for inhalation: INHALE ONE PUFF INTO LUNGS ONCE DAILY  Quantity requested: 90  Last fill date: 3/8/17  Last visit with ANTHONY MCKINNEY was on: 17 in [facility]  PCP:  Anthony Mckinney MD  Diagnosis r/t this medication request: COPD

## 2018-02-13 NOTE — NURSING NOTE
Patient Information     Patient Name MRN Aggie Peacock [id number] Female 1950      Nursing Note by Jocelyne Durant at 2018  4:00 PM     Author:  Jocelyne Durant Service:  (none) Author Type:  (none)     Filed:  2018  4:08 PM Encounter Date:  2018 Status:  Signed     :  Jocelyne Durant            Aggie Bunn is a 67 y.o. Female here to talk about medication refill, and states problem with stomach.   Dona Durant LPN ...... 2018 3:55 PM

## 2018-02-13 NOTE — TELEPHONE ENCOUNTER
Patient Information     Patient Name MRN Aggie Peacock [id number] Female 1950      Telephone Encounter by Shirin Murphy RN at 2018  2:16 PM     Author:  Shirin Murphy RN Service:  (none) Author Type:  NURS- Registered Nurse     Filed:  2018  2:23 PM Encounter Date:  2018 Status:  Signed     :  Shirin Murphy RN (NURS- Registered Nurse)            [facility] is requesting a refill of Imdur again.  Per last office visit notes on 17, the Rx for Imdur was discontinued due to patient's report that it caused palpitations. Spoke with pharmacy tech yesterday and informed Imdur was d/c'd. Patient will need to see Cayden Hartman MD if she needs new prescription to evaluated symptoms and options.  Unable to complete prescription refill per RN Medication Refill Policy.................... Shirin Mruphy RN ....................  2018   2:23 PM

## 2018-02-13 NOTE — PROGRESS NOTES
Patient Information     Patient Name MRN Sex Aggie Sabillon [id number] Female 1950      Progress Notes by Stacie Valadez at 2018  8:46 AM     Author:  Stacie Valadez Service:  (none) Author Type:  Other Clinical Staff     Filed:  2018  8:47 AM Date of Service:  2018  8:46 AM Status:  Signed     :  Stacie Valadez (Other Clinical Staff)            Falls Risk Criteria:    Age 65 and older or under age 4        Sensory deficits    Poor vision    Use of ambulatory aides    Impaired judgment    Unable to walk independently    Meets High Risk criteria for falls:  Yes             1.  Do you have dizziness or vertigo?    yes                    2.  Do you need help standing or walking?   yes                 3.  Have you fallen within the last 6 months?    no           4.  Has the patient been fasting?      yes       If any risks are marked Yes, the following interventions are utilized:    Do not leave patient unattended     Assist patient in the dressing room and bathroom    Have ambulatory aides available throughout procedure    Involve patient s family if available

## 2018-02-13 NOTE — PROGRESS NOTES
Patient Information     Patient Name MRN Sex Aggie Sabillon [id number] Female 1950      Progress Notes by Stacie Valadez at 2018  8:46 AM     Author:  Stacie Valadez Service:  (none) Author Type:  Other Clinical Staff     Filed:  2018  8:46 AM Date of Service:  2018  8:46 AM Status:  Signed     :  Stacie Valadez (Other Clinical Staff)            IV Contrast- Discharge Instructions After Your CT Scan      The IV contrast you received today will be filtered from your bloodstream by your kidneys during the next 24 hours and pass from the body in urine.  You will not be aware of this process and your urine will not change in color.  To help this process you should drink at least 4 additional glasses of water or juice today.  This reduces stress on your kidneys.    Most contrast reactions are immediate.  Should you develop symptoms of concern after discharge, contact the department at the number below.  After hours you should contact your personal physician.  If you develop breathing distress or wheezing, call 911.

## 2018-02-26 PROBLEM — K63.89 MASS OF COLON: Status: ACTIVE | Noted: 2018-01-01

## 2018-02-26 NOTE — NURSING NOTE
The patient is here today to get results on a recent ct scan.  ENRIQUE MAZARIEGOS LPN 2/26/2018 12:58 PM

## 2018-02-26 NOTE — MR AVS SNAPSHOT
"              After Visit Summary   2/26/2018    Aggie Bunn    MRN: [id number]           Patient Information     Date Of Birth          1950        Visit Information        Provider Department      2/26/2018 1:00 PM Cayden Hartman MD [facility]        Today's Diagnoses     Mass of colon    -  1    NSTEMI (non-ST elevated myocardial infarction) (H)        Chronic obstructive pulmonary disease, unspecified COPD type (H)        Generalized anxiety disorder           Follow-ups after your visit        Additional Services     COLORECTAL SURGERY REFERRAL       [facility]                  Who to contact     If you have questions or need follow up information about today's clinic visit or your schedule please contact [facility] directly at [phone].  Normal or non-critical lab and imaging results will be communicated to you by [employer], letter or phone within 4 business days after the clinic has received the results. If you do not hear from us within 7 days, please contact the clinic through [employer] or phone. If you have a critical or abnormal lab result, we will notify you by phone as soon as possible.  Submit refill requests through [employer] or call your pharmacy and they will forward the refill request to us. Please allow 3 business days for your refill to be completed.          Additional Information About Your Visit        MyChart Information     [employer] lets you send messages to your doctor, view your test results, renew your prescriptions, schedule appointments and more. To sign up, go to [email or web address] . Click on \"Log in\" on the left side of the screen, which will take you to the Welcome page. Then click on \"Sign up Now\" on the right side of the page.     You will be asked to enter the access code listed below, as well as some personal information. Please follow the directions to create your username and password.     Your access code is: " [email or web address]  Expires: 2018  1:39 PM     Your access code will  in 90 days. If you need help or a new code, please call your [facility] clinic or [phone].        Care EveryWhere ID     This is your Care EveryWhere ID. This could be used by other organizations to access your [facility] medical records  [id number]        Your Vitals Were     Breastfeeding? BMI (Body Mass Index)                No 22.19 kg/m2           Blood Pressure from Last 3 Encounters:   18 114/72   18 130/70   17 112/84    Weight from Last 3 Encounters:   18 130 lb (59 kg)   18 131 lb (59.4 kg)   17 124 lb (56.2 kg)              We Performed the Following     COLORECTAL SURGERY REFERRAL          Where to get your medicines      Some of these will need a paper prescription and others can be bought over the counter.  Ask your nurse if you have questions.     Bring a paper prescription for each of these medications     ALPRAZolam 0.5 MG tablet          Primary Care Provider Office Phone # Fax #    Cayden Hartman [phone] [address] [facility] [address]        Equal Access to Services     ASHU AGGARWAL : Hadii marck guzman Sogreer, waaxda kimberly, qaybta kaalmada adekareenda, ej russell. So [facility] [phone].    ATENCIÓN: Si habla español, tiene a barth disposición servicios gratuitos de asistencia lingüística. Llame al [phone].    We comply with applicable federal civil rights laws and [place] laws. We do not discriminate on the basis of race, color, national origin, age, disability, sex, sexual orientation, or gender identity.            Thank you!     Thank you for choosing [facility]  for your care. Our goal is always to provide you with excellent care. Hearing back from our patients is one way we can continue to improve our services. Please take a few minutes to complete the written survey that you may  receive in the mail after your visit with us. Thank you!             Your Updated Medication List - Protect others around you: Learn how to safely use, store and throw away your medicines at www.disposemymeds.org.          This list is accurate as of 2/26/18  1:39 PM.  Always use your most recent med list.                   Brand Name Dispense Instructions for use Diagnosis    acetaminophen 500 MG tablet    TYLENOL     Take 500-1,000 mg by mouth every 6 hours as needed Max acetaminophen dose: 4000mg in 24 hrs.        albuterol (2.5 MG/3ML) 0.083% neb solution      Take 1 vial by nebulization every 2 hours as needed for shortness of breath / dyspnea or wheezing        ALPRAZolam 0.5 MG tablet    XANAX    45 tablet    Take 1 tablet (0.5 mg) by mouth 2 times daily as needed    Generalized anxiety disorder       INCRUSE ELLIPTA 62.5 MCG/INH oral inhaler   Generic drug:  umeclidinium      Inhale 1 puff into the lungs daily        isosorbide mononitrate 30 MG 24 hr tablet    IMDUR    90 tablet    Take 0.5 tablets (15 mg) by mouth daily        nitroGLYcerin 0.4 MG sublingual tablet    NITROSTAT     Place 1 tablet under the tongue every 5 minutes as needed for chest pain        SYMBICORT 160-4.5 MCG/ACT Inhaler   Generic drug:  budesonide-formoterol      Inhale 2 puffs into the lungs 2 times daily
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
## 2018-03-01 NOTE — TELEPHONE ENCOUNTER
Rx already addressed. Xanax was filled on 2-26-18 for #45 X 3 refills and given to patient in office visit. Pharmacy informed. Shirin Murphy RN on 3/1/2018 at 3:06 PM

## 2018-07-05 NOTE — TELEPHONE ENCOUNTER
In clinical absence of patient's primary, Cayden Hartman, patient is requesting that this message be sent to the primary provider's Teamlet for consideration please.      URGENT: PATIENT WAITING IN STORE.    ALPRAZOLAM 0.5MG TAB  Last Written Prescription Date:  5/25/18  Last Fill Quantity: 45,   # refills: 1  Last Office Visit: 2/26/18  Future Office visit:    Next 5 appointments (look out 90 days)     Jul 16, 2018  2:00 PM CDT   SHORT with Cayden Hartman MD   [facility] ([facility])    [address]  [facility] [address]   [phone]                 Routing refill request to provider for review/approval because:  Drug not on the [facility], [facility] or [facility] refill protocol or controlled substance    Unable to complete prescription refill per RN Medication Refill Policy. Shirin Caal RN .............. 7/5/2018  3:40 PM

## 2018-07-06 NOTE — TELEPHONE ENCOUNTER
Prescription has been printed and can be faxed.  The patient should keep appointment with Dr. Hartman in July.

## 2018-07-16 NOTE — NURSING NOTE
The patient is here today to be seen for a er follow up.  Adalgisa Bentley LPN on 7/16/2018 at 1:48 PM

## 2018-07-16 NOTE — MR AVS SNAPSHOT
After Visit Summary   7/16/2018    Aggie Bunn    MRN: [id number]           Patient Information     Date Of Birth          1950        Visit Information        Provider Department      7/16/2018 2:00 PM Cayden Hartman MD [facility]        Today's Diagnoses     Chronic obstructive pulmonary disease, unspecified COPD type (H)    -  1    Mass of colon        Generalized anxiety disorder        NSTEMI (non-ST elevated myocardial infarction) (H)           Follow-ups after your visit        Who to contact     If you have questions or need follow up information about today's clinic visit or your schedule please contact [facility] directly at [phone].  Normal or non-critical lab and imaging results will be communicated to you by MyChart, letter or phone within 4 business days after the clinic has received the results. If you do not hear from us within 7 days, please contact the clinic through MyChart or phone. If you have a critical or abnormal lab result, we will notify you by phone as soon as possible.  Submit refill requests through [employer] or call your pharmacy and they will forward the refill request to us. Please allow 3 business days for your refill to be completed.          Additional Information About Your Visit        Care EveryWhere ID     This is your Care EveryWhere ID. This could be used by other organizations to access your [place] medical records  [id number]        Your Vitals Were     Pulse Respirations Breastfeeding?             80 18 No          Blood Pressure from Last 3 Encounters:   07/16/18 124/72   02/26/18 114/72   01/30/18 130/70    Weight from Last 3 Encounters:   02/26/18 130 lb (59 kg)   01/30/18 131 lb (59.4 kg)   08/30/17 124 lb (56.2 kg)              Today, you had the following     No orders found for display         Today's Medication Changes          These changes are accurate as of 7/16/18  2:23 PM.  If you have  any questions, ask your nurse or doctor.               These medicines have changed or have updated prescriptions.        Dose/Directions    ALPRAZolam 0.5 MG tablet   Commonly known as:  XANAX   This may have changed:  See the new instructions.   Used for:  Generalized anxiety disorder   Changed by:  Cayden Hartman MD        Dose:  0.5 mg   Take 1 tablet (0.5 mg) by mouth 3 times daily as needed for anxiety   Quantity:  75 tablet   Refills:  3       INCRUSE ELLIPTA 62.5 MCG/INH oral inhaler   This may have changed:  Another medication with the same name was removed. Continue taking this medication, and follow the directions you see here.   Generic drug:  umeclidinium   Changed by:  Cayden Hartman MD        Dose:  1 puff   Inhale 1 puff into the lungs daily   Refills:  0       isosorbide mononitrate 30 MG 24 hr tablet   Commonly known as:  IMDUR   This may have changed:  Another medication with the same name was removed. Continue taking this medication, and follow the directions you see here.   Changed by:  Cayden Hartman MD        Dose:  15 mg   Take 0.5 tablets (15 mg) by mouth daily   Quantity:  90 tablet   Refills:  3            Where to get your medicines      These medications were sent to [facility] [address] [place] [id number]     Phone:  [phone]     nitroGLYcerin 0.4 MG sublingual tablet         Some of these will need a paper prescription and others can be bought over the counter.  Ask your nurse if you have questions.     Bring a paper prescription for each of these medications     ALPRAZolam 0.5 MG tablet                Primary Care Provider Office Phone # Fax #    Cayden Hartman [phone]       [address] [facility] [address]        Equal Access to Services     CHANCE AGGARWAL AH: Rosas Marquez, waaxda luqadaha, qaybta kaalmada kevin, ej russell.  So [facility] [phone].    ATENCIÓN: Si jovita marquez, tiene a barth disposición servicios gratuitos de asistencia lingüística. Lenny botello [phone].    We comply with applicable federal civil rights laws and [place] laws. We do not discriminate on the basis of race, color, national origin, age, disability, sex, sexual orientation, or gender identity.            Thank you!     Thank you for choosing [facility] AND [facility]  for your care. Our goal is always to provide you with excellent care. Hearing back from our patients is one way we can continue to improve our services. Please take a few minutes to complete the written survey that you may receive in the mail after your visit with us. Thank you!             Your Updated Medication List - Protect others around you: Learn how to safely use, store and throw away your medicines at www.disposemymeds.org.          This list is accurate as of 7/16/18  2:23 PM.  Always use your most recent med list.                   Brand Name Dispense Instructions for use Diagnosis    acetaminophen 500 MG tablet    TYLENOL     Take 500-1,000 mg by mouth every 6 hours as needed Max acetaminophen dose: 4000mg in 24 hrs.        albuterol (2.5 MG/3ML) 0.083% neb solution      Take 1 vial by nebulization every 2 hours as needed for shortness of breath / dyspnea or wheezing        ALPRAZolam 0.5 MG tablet    XANAX    75 tablet    Take 1 tablet (0.5 mg) by mouth 3 times daily as needed for anxiety    Generalized anxiety disorder       INCRUSE ELLIPTA 62.5 MCG/INH oral inhaler   Generic drug:  umeclidinium      Inhale 1 puff into the lungs daily        isosorbide mononitrate 30 MG 24 hr tablet    IMDUR    90 tablet    Take 0.5 tablets (15 mg) by mouth daily        nitroGLYcerin 0.4 MG sublingual tablet    NITROSTAT    25 tablet    Place 1 tablet (0.4 mg) under the tongue every 5 minutes as needed for chest pain    NSTEMI (non-ST elevated myocardial infarction) (H)       SYMBICORT 160-4.5  MCG/ACT Inhaler   Generic drug:  budesonide-formoterol      Inhale 2 puffs into the lungs 2 times daily
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
## 2018-07-23 NOTE — PROGRESS NOTES
Patient Information     Patient Name  Aggie Bunn MRN  [id number] Sex  Female   1950      Letter by Cayden Hartman MD at      Author:  Cayden Hartman MD Service:  (none) Author Type:  (none)    Filed:   Encounter Date:  2017 Status:  (Other)           Aggie Bunn  [address]  [address] [place] [id number]          2017    Dear Ms. Bunn:    Following are the tests completed during your last clinic visit:    Results for orders placed or performed in visit on 17      COMPLETE METABOLIC PANEL      Result  Value Ref Range    SODIUM 141 133 - 143 mmol/L    POTASSIUM 4.2 3.5 - 5.1 mmol/L    CHLORIDE 104 98 - 107 mmol/L    CO2,TOTAL 25 21 - 31 mmol/L    ANION GAP 12 5 - 18                    GLUCOSE 101 70 - 105 mg/dL    CALCIUM 10.0 8.6 - 10.3 mg/dL    BUN 12 7 - 25 mg/dL    CREATININE 0.67 (L) 0.70 - 1.30 mg/dL    BUN/CREAT RATIO           18                    GFR if African American >60 >60 ml/min/1.73m2    GFR if not African American >60 >60 ml/min/1.73m2    ALBUMIN 4.1 3.5 - 5.7 g/dL    PROTEIN,TOTAL 7.1 6.4 - 8.9 g/dL    GLOBULIN                  3.0 2.0 - 3.7 g/dL    A/G RATIO 1.4 1.0 - 2.0                    BILIRUBIN,TOTAL 0.7 0.3 - 1.0 mg/dL    ALK PHOSPHATASE 117 (H) 34 - 104 IU/L    ALT (SGPT) 17 7 - 52 IU/L    AST (SGOT) 19 13 - 39 IU/L   CBC WITH AUTO DIFFERENTIAL      Result  Value Ref Range    WHITE BLOOD COUNT         8.0 4.5 - 11.0 thou/cu mm    RED BLOOD COUNT           4.55 4.00 - 5.20 mil/cu mm    HEMOGLOBIN                15.0 12.0 - 16.0 g/dL    HEMATOCRIT                44.3 33.0 - 51.0 %    MCV                       97 80 - 100 fL    MCH                       33.0 26.0 - 34.0 pg    MCHC                      33.9 32.0 - 36.0 g/dL    RDW                       12.1 11.5 - 15.5 %    PLATELET COUNT            219 140 - 440 thou/cu mm    MPV                       9.5 6.5 - 11.0 fL    NEUTROPHILS               72.2 (H) 42.0 - 72.0 %    LYMPHOCYTES                15.1 (L) 20.0 - 44.0 %    MONOCYTES                 8.3 <12.0 %    EOSINOPHILS               3.3 <8.0 %    BASOPHILS                 0.8 <3.0 %    IMMATURE GRANULOCYTES(METAS,MYELOS,PROS) 0.3 %    ABSOLUTE NEUTROPHILS      5.8 1.7 - 7.0 thou/cu mm    ABSOLUTE LYMPHOCYTES      1.2 0.9 - 2.9 thou/cu mm    ABSOLUTE MONOCYTES        0.7 <0.9 thou/cu mm    ABSOLUTE EOSINOPHILS      0.3 <0.5 thou/cu mm    ABSOLUTE BASOPHILS        0.1 <0.3 thou/cu mm    ABSOLUTE IMMATURE GRANULOCYTES(METAS,MYELOS,PROS) 0.0 <=0.3 thou/cu mm         Your blood tests look very good. Congratulations on this report. If you have any questions about your results, feel free to contact me.    Sincerely,      Cayden Hartman MD  Internal Medicine  [facility]

## 2018-07-24 NOTE — PROGRESS NOTES
Patient Information     Patient Name  Aggie Bunn MRN  [id number] Sex  Female   1950      Letter by Cayden Hartman MD at      Author:  Cayden Hartman MD Service:  (none) Author Type:  (none)    Filed:   Date of Service:   Status:  (Other)           Aggie Bunn  [address]  [address]  [facility] [address]          2018    Dear Ms. Bunn:    Your recent CT scan has returned. This shows that you have a spot on your colon. I am not sure what this represents but we do need to talk about this further. Schedule an appointment at your convenience and we will review this in much greater detail.    Sincerely,      Cayden Hartman MD  Internal Medicine  [facility] and [facility]

## 2018-07-24 NOTE — PROGRESS NOTES
Patient Information     Patient Name  Aggie Bunn MRN  [id number] Sex  Female   1950      Letter by Cayden Hartman MD at      Author:  Cayden Hartman MD Service:  (none) Author Type:  (none)    Filed:   Encounter Date:  2018 Status:  (Other)           Aggie Bunn  [address]  [address]  [facility] [address]          2018    Dear Aggie,    Following are the tests completed during your last clinic visit:    Results for orders placed or performed in visit on 18      COMPLETE METABOLIC PANEL      Result  Value Ref Range    SODIUM 141 133 - 143 mmol/L    POTASSIUM 3.8 3.5 - 5.1 mmol/L    CHLORIDE 103 98 - 107 mmol/L    CO2,TOTAL 25 21 - 31 mmol/L    ANION GAP 13 5 - 18                    GLUCOSE 109 (H) 70 - 105 mg/dL    CALCIUM 9.4 8.6 - 10.3 mg/dL    BUN 16 7 - 25 mg/dL    CREATININE 0.59 (L) 0.70 - 1.30 mg/dL    BUN/CREAT RATIO           27                    GFR if African American >60 >60 ml/min/1.73m2    GFR if not African American >60 >60 ml/min/1.73m2    ALBUMIN 4.2 3.5 - 5.7 g/dL    PROTEIN,TOTAL 6.6 6.4 - 8.9 g/dL    GLOBULIN                  2.4 2.0 - 3.7 g/dL    A/G RATIO 1.8 1.0 - 2.0                    BILIRUBIN,TOTAL 0.3 0.3 - 1.0 mg/dL    ALK PHOSPHATASE 104 34 - 104 IU/L    ALT (SGPT) 11 7 - 52 IU/L    AST (SGOT) 15 13 - 39 IU/L   CBC WITH AUTO DIFFERENTIAL      Result  Value Ref Range    WHITE BLOOD COUNT         7.7 4.5 - 11.0 thou/cu mm    RED BLOOD COUNT           4.31 4.00 - 5.20 mil/cu mm    HEMOGLOBIN                13.9 12.0 - 16.0 g/dL    HEMATOCRIT                42.0 33.0 - 51.0 %    MCV                       97 80 - 100 fL    MCH                       32.3 26.0 - 34.0 pg    MCHC                      33.1 32.0 - 36.0 g/dL    RDW                       11.9 11.5 - 15.5 %    PLATELET COUNT            232 140 - 440 thou/cu mm    MPV                       9.4 6.5 - 11.0 fL    NEUTROPHILS               71.0 42.0 - 72.0 %    LYMPHOCYTES               17.3 (L)  20.0 - 44.0 %    MONOCYTES                 7.2 <12.0 %    EOSINOPHILS               3.7 <8.0 %    BASOPHILS                 0.5 <3.0 %    IMMATURE GRANULOCYTES(METAS,MYELOS,PROS) 0.3 %    ABSOLUTE NEUTROPHILS      5.4 1.7 - 7.0 thou/cu mm    ABSOLUTE LYMPHOCYTES      1.3 0.9 - 2.9 thou/cu mm    ABSOLUTE MONOCYTES        0.6 <0.9 thou/cu mm    ABSOLUTE EOSINOPHILS      0.3 <0.5 thou/cu mm    ABSOLUTE BASOPHILS        0.0 <0.3 thou/cu mm    ABSOLUTE IMMATURE GRANULOCYTES(METAS,MYELOS,PROS) 0.0 <=0.3 thou/cu mm         Your recent blood tests look normal. Congratulations on this report.    Sincerely,      Cayden Hartman MD  Internal Medicine  [facility]

## 2018-09-17 NOTE — ED TRIAGE NOTES
"Pt comes in with severe pain in her abd. Pt has colon cancer. Pt also has COPD and is having more trouble breathing. Pt reports pain is unbearable and has no appetite.  /83  Pulse 141  Temp 99.1  F (37.3  C) (Tympanic)  Resp 20  Ht 1.626 m (5' 4\")  Wt 54.4 kg (120 lb)  SpO2 93%  Breastfeeding? No  BMI 20.6 kg/m2    Bailey Whitt RN on 9/17/2018 at 1:34 PM    "
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
## 2018-09-17 NOTE — ED TRIAGE NOTES
COLUMBIA-SUICIDE SEVERITY RATING SCALE   Screen with Triage Points for Emergency Department      Ask questions that are bolded and underlined.   Past  month   Ask Questions 1 and 2 YES NO   1)  Have you wished you were dead or wished you could go to sleep and not wake up?  yes    2)  Have you actually had any thoughts of killing yourself?   no   If YES to 2, ask questions 3, 4, 5, and 6.  If NO to 2, go directly to question 6.   3)  Have you been thinking about how you might do this?   E.g.  I thought about taking an overdose but I never made a specific plan as to when where or how I would actually do it .and I would never go through with it.       4)  Have you had these thoughts and had some intention of acting on them?   As opposed to  I have the thoughts but I definitely will not do anything about them.       5)  Have you started to work out or worked out the details of how to kill yourself? Do you intend to carry out this plan?      6)  Have you ever done anything, started to do anything, or prepared to do anything to end your life?  Examples: Collected pills, obtained a gun, gave away valuables, wrote a will or suicide note, took out pills but didn t swallow any, held a gun but changed your mind or it was grabbed from your hand, went to the roof but didn t jump; or actually took pills, tried to shoot yourself, cut yourself, tried to hang yourself, etc.    If YES, ask: Was this within the past three months?  Lifetime     no    Past 3 Months        Item 1:  Behavioral Health Referral at Discharge  Item 2:  Behavioral Health Referral at Discharge   Item 3:  Behavioral Health Consult (Psychiatric Nurse/) and consider Patient Safety Precautions  Item 4:  Immediate Notification of Physician and/or Behavioral Health and Patient Safety Precautions   Item 5:  Immediate Notification of Physician and/or Behavioral Health and Patient Safety Precautions  Item 6:  Over 3 months ago: Behavioral Health Consult  (Psychiatric Nurse/) and consider Patient Safety Precautions  OR  Item 6:  3 months ago or less: Immediate Notification of Physician and/or Behavioral Health and Patient Safety Precautions
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
## (undated) RX ORDER — ONDANSETRON 2 MG/ML
INJECTION INTRAMUSCULAR; INTRAVENOUS
Status: DISPENSED
Start: 2018-01-01

## (undated) RX ORDER — SODIUM CHLORIDE 9 MG/ML
INJECTION, SOLUTION INTRAVENOUS
Status: DISPENSED
Start: 2018-01-01

## (undated) RX ORDER — HYDROMORPHONE HYDROCHLORIDE 1 MG/ML
INJECTION, SOLUTION INTRAMUSCULAR; INTRAVENOUS; SUBCUTANEOUS
Status: DISPENSED
Start: 2018-01-01

## (undated) RX ORDER — IPRATROPIUM BROMIDE AND ALBUTEROL SULFATE 2.5; .5 MG/3ML; MG/3ML
SOLUTION RESPIRATORY (INHALATION)
Status: DISPENSED
Start: 2018-01-01